# Patient Record
Sex: FEMALE | Race: NATIVE HAWAIIAN OR OTHER PACIFIC ISLANDER | Employment: FULL TIME | URBAN - METROPOLITAN AREA
[De-identification: names, ages, dates, MRNs, and addresses within clinical notes are randomized per-mention and may not be internally consistent; named-entity substitution may affect disease eponyms.]

---

## 2018-05-21 PROBLEM — M75.101 ROTATOR CUFF SYNDROME, RIGHT: Status: ACTIVE | Noted: 2018-01-29

## 2018-05-21 PROBLEM — M75.101 ROTATOR CUFF TEAR, RIGHT: Status: ACTIVE | Noted: 2018-03-05

## 2018-05-22 PROBLEM — Z47.89 AFTERCARE FOLLOWING SURGERY OF THE MUSCULOSKELETAL SYSTEM: Status: ACTIVE | Noted: 2018-03-26

## 2018-05-22 PROBLEM — M25.511 SHOULDER PAIN, RIGHT: Status: ACTIVE | Noted: 2018-05-22

## 2018-06-14 PROBLEM — Z12.12 SCREENING FOR COLORECTAL CANCER: Status: ACTIVE | Noted: 2018-06-14

## 2018-06-14 PROBLEM — Z12.11 SCREENING FOR COLORECTAL CANCER: Status: ACTIVE | Noted: 2018-06-14

## 2018-12-12 PROBLEM — Z87.891 FORMER SMOKER: Status: ACTIVE | Noted: 2018-12-12

## 2018-12-26 PROBLEM — N64.4 BREAST PAIN, LEFT: Status: ACTIVE | Noted: 2018-12-26

## 2018-12-26 PROBLEM — G44.52 NEW DAILY PERSISTENT HEADACHE: Status: ACTIVE | Noted: 2018-12-26

## 2018-12-26 PROBLEM — R11.2 NON-INTRACTABLE VOMITING WITH NAUSEA: Status: ACTIVE | Noted: 2018-12-26

## 2018-12-26 PROBLEM — R05.9 COUGH IN ADULT PATIENT: Status: ACTIVE | Noted: 2018-12-26

## 2019-05-22 PROBLEM — L30.9 DERMATITIS: Status: ACTIVE | Noted: 2019-05-22

## 2019-05-22 PROBLEM — N64.4 BREAST PAIN, LEFT: Status: RESOLVED | Noted: 2018-12-26 | Resolved: 2019-05-22

## 2019-05-22 PROBLEM — R05.9 COUGH IN ADULT PATIENT: Status: RESOLVED | Noted: 2018-12-26 | Resolved: 2019-05-22

## 2019-05-22 PROBLEM — G44.52 NEW DAILY PERSISTENT HEADACHE: Status: RESOLVED | Noted: 2018-12-26 | Resolved: 2019-05-22

## 2019-05-22 PROBLEM — E73.9 LACTOSE INTOLERANCE IN ADULT: Status: ACTIVE | Noted: 2019-05-22

## 2019-05-22 PROBLEM — Z47.89 AFTERCARE FOLLOWING SURGERY OF THE MUSCULOSKELETAL SYSTEM: Status: RESOLVED | Noted: 2018-03-26 | Resolved: 2019-05-22

## 2019-05-22 PROBLEM — R11.2 NON-INTRACTABLE VOMITING WITH NAUSEA: Status: RESOLVED | Noted: 2018-12-26 | Resolved: 2019-05-22

## 2019-06-11 PROBLEM — M85.859 OSTEOPENIA OF NECK OF FEMUR: Status: ACTIVE | Noted: 2019-06-11

## 2019-06-19 PROBLEM — Z80.3 FAMILY HISTORY OF BREAST CANCER: Status: ACTIVE | Noted: 2019-06-19

## 2019-08-07 PROBLEM — M25.512 TRIGGER POINT OF SHOULDER REGION, LEFT: Status: ACTIVE | Noted: 2019-08-07

## 2019-08-07 PROBLEM — M62.838 MUSCLE SPASM OF LEFT SHOULDER: Status: ACTIVE | Noted: 2019-08-07

## 2019-08-16 PROBLEM — M79.7 FIBROMYALGIA: Status: ACTIVE | Noted: 2019-08-16

## 2019-08-16 PROBLEM — M47.812 CERVICAL SPONDYLOSIS: Status: ACTIVE | Noted: 2019-08-16

## 2020-01-30 PROBLEM — G56.03 CARPAL TUNNEL SYNDROME, BILATERAL: Status: ACTIVE | Noted: 2020-01-30

## 2020-07-10 PROBLEM — G56.01 RIGHT CARPAL TUNNEL SYNDROME: Status: ACTIVE | Noted: 2020-07-10

## 2020-08-17 PROBLEM — G56.02 LEFT CARPAL TUNNEL SYNDROME: Status: ACTIVE | Noted: 2020-08-17

## 2021-06-14 PROBLEM — M77.8 HAND TENDONITIS: Status: ACTIVE | Noted: 2021-06-14

## 2021-06-14 PROBLEM — M65.312 TRIGGER THUMB OF LEFT HAND: Status: ACTIVE | Noted: 2021-06-14

## 2021-08-23 ENCOUNTER — TELEPHONE (OUTPATIENT)
Dept: OTHER | Age: 56
End: 2021-08-23

## 2021-08-23 NOTE — TELEPHONE ENCOUNTER
Pt name and  verified. Pt is calling to schedule next AE. Pt has Rayo Yañez removed from [facility]. Pt scheduled for NP appt on  at 3:00 with CARISSA Miles to re-establish with her as Pt prefers a female provider anyway. Pt requesting a return call should she need lab work performed.     [phone] (home)     Александр Castillo

## 2022-06-29 PROBLEM — M19.041 ARTHRITIS OF RIGHT HAND: Status: ACTIVE | Noted: 2022-06-29

## 2022-06-29 PROBLEM — M19.042 ARTHRITIS OF LEFT HAND: Status: ACTIVE | Noted: 2022-06-29

## 2022-06-29 PROBLEM — M18.11 ARTHRITIS OF CARPOMETACARPAL (CMC) JOINT OF RIGHT THUMB: Status: ACTIVE | Noted: 2022-06-29

## 2022-06-29 PROBLEM — M18.12 ARTHRITIS OF CARPOMETACARPAL (CMC) JOINT OF LEFT THUMB: Status: ACTIVE | Noted: 2022-06-29
